# Patient Record
Sex: MALE | Race: WHITE
[De-identification: names, ages, dates, MRNs, and addresses within clinical notes are randomized per-mention and may not be internally consistent; named-entity substitution may affect disease eponyms.]

---

## 2017-07-31 ENCOUNTER — HOSPITAL ENCOUNTER (OUTPATIENT)
Dept: HOSPITAL 47 - RADBDWWP | Age: 67
End: 2017-07-31
Attending: UROLOGY
Payer: COMMERCIAL

## 2017-07-31 DIAGNOSIS — M85.80: Primary | ICD-10-CM

## 2017-07-31 PROCEDURE — 77080 DXA BONE DENSITY AXIAL: CPT

## 2017-07-31 NOTE — BD
EXAMINATION TYPE: MG DEXA axial skeleton.  

 

DATE OF EXAM: 7/31/2017

 

COMPARISON: NONE

 

CLINICAL HISTORY: M81.8 OSTEOPOROSIS W/O CURRENT PATH FX

 

Height:  64.5

Weight:  232

 

FRAX RISK QUESTIONS:

Alcohol (3 or more units per day):  NO

Family History (Parent hip fracture):  NO

Glucocorticoids (More than 3mos):  NO

           (Ex: prednisone, prednisolone, methylprednisolone, dexamethasone, and hydrocortisone).    
     

History of Fracture in Adulthood: NO

Secondary Osteoporosis: NO

  1.  Type 1 Diabetes: NO

  2.  Hyperthyroidism: NO

  3.  Menopause before 45: NA

  4.  Malnutrition: NO

  5.  Chronic liver disease: NO

Rheumatoid Arthritis: NO

Current Tobacco Use: NO

 

RISK FACTORS 

HISTORY OF: 

 

Diet low in dairy products/other sources of calcium:  NO

Lost more than 2 inches in height since high school: NO

Hyperparathyroidism: NO

Adrenal Insufficiency: NO

 

MEDICATIONS: 

Additional Medications: BP MEDS, CHEMO FOR PROSTATE CA, CALCIUM  AND VIT D...X1MO ONLY 

 

 

Additional History: PROSTATE CA

 

 

EXAM MEASUREMENTS: 

Bone mineral densitometry was performed using the [employer] System.

Bone mineral density as measured about the Lumbar spine is:  

----- L1-L4(G/cm2): 1.374

T Score Values are as follows:

----- L1:  2.5

----- L2:  1.2

----- L3:  1.5

----- L4:  1.2

----- L1-L4:  1.6

Bone mineral density     THIS IS HIS FIRST BONE DENSITY TEST.....BASELINE STUDY

 

Bone mineral density about the R hip (g/cm2): 0.974

Bone mineral density about the L hip (g/cm2):  1.004

T Score values are as follows:

-----R Neck: -1.3

-----L Neck:  -0.8

-----R Total:  -0.3

-----L Total:  0.0

Bone mineral density    FIRST BONE DENSITY SCAN.......BASELINE

 

 FRAX %'S:    THERE IS A 6.0% CHANCE OF A MAJOR OSTEOPOROTIC FX AND A 1.2% CHANCE OF A HIP FX....PROB
ABILITY IN 10 YRS TIME

 

IMPRESSION:

Osteopenia (T Score between -2.5 and -1 as noted by T score values at femoral neck level in the right
 hip. There is slightly increased risk of fracture and the patient may be considered  for treatment. 
Re-Screen 2-5 years   

 

NOTE:  T-SCORE=SD OF THE YOUNG ADULT MEAN.

## 2018-06-11 ENCOUNTER — HOSPITAL ENCOUNTER (INPATIENT)
Dept: HOSPITAL 47 - EC | Age: 68
LOS: 3 days | Discharge: NURSING, REHAB OR LONG-TERM CARE | DRG: 65 | End: 2018-06-14
Attending: INTERNAL MEDICINE | Admitting: INTERNAL MEDICINE
Payer: COMMERCIAL

## 2018-06-11 VITALS — BODY MASS INDEX: 31.3 KG/M2

## 2018-06-11 DIAGNOSIS — E78.5: ICD-10-CM

## 2018-06-11 DIAGNOSIS — J84.10: ICD-10-CM

## 2018-06-11 DIAGNOSIS — C61: ICD-10-CM

## 2018-06-11 DIAGNOSIS — Z79.82: ICD-10-CM

## 2018-06-11 DIAGNOSIS — Z80.9: ICD-10-CM

## 2018-06-11 DIAGNOSIS — I11.9: ICD-10-CM

## 2018-06-11 DIAGNOSIS — R29.701: ICD-10-CM

## 2018-06-11 DIAGNOSIS — Z79.899: ICD-10-CM

## 2018-06-11 DIAGNOSIS — Z82.49: ICD-10-CM

## 2018-06-11 DIAGNOSIS — I63.512: Primary | ICD-10-CM

## 2018-06-11 DIAGNOSIS — Z79.891: ICD-10-CM

## 2018-06-11 DIAGNOSIS — Z74.1: ICD-10-CM

## 2018-06-11 DIAGNOSIS — G81.91: ICD-10-CM

## 2018-06-11 LAB
ALBUMIN SERPL-MCNC: 4.4 G/DL (ref 3.5–5)
ALP SERPL-CCNC: 117 U/L (ref 38–126)
ALT SERPL-CCNC: 26 U/L (ref 21–72)
ANION GAP SERPL CALC-SCNC: 17 MMOL/L
APTT BLD: 23.5 SEC (ref 22–30)
AST SERPL-CCNC: 24 U/L (ref 17–59)
BASOPHILS # BLD AUTO: 0 K/UL (ref 0–0.2)
BASOPHILS NFR BLD AUTO: 1 %
BUN SERPL-SCNC: 20 MG/DL (ref 9–20)
CALCIUM SPEC-MCNC: 9.7 MG/DL (ref 8.4–10.2)
CHLORIDE SERPL-SCNC: 101 MMOL/L (ref 98–107)
CK SERPL-CCNC: 208 U/L (ref 55–170)
CO2 SERPL-SCNC: 23 MMOL/L (ref 22–30)
EOSINOPHIL # BLD AUTO: 0.2 K/UL (ref 0–0.7)
EOSINOPHIL NFR BLD AUTO: 2 %
ERYTHROCYTE [DISTWIDTH] IN BLOOD BY AUTOMATED COUNT: 4.57 M/UL (ref 4.3–5.9)
ERYTHROCYTE [DISTWIDTH] IN BLOOD: 13.6 % (ref 11.5–15.5)
GLUCOSE BLD-MCNC: 101 MG/DL (ref 75–99)
GLUCOSE SERPL-MCNC: 106 MG/DL (ref 74–99)
HCT VFR BLD AUTO: 40.5 % (ref 39–53)
HGB BLD-MCNC: 13.9 GM/DL (ref 13–17.5)
INR PPP: 1 (ref ?–1.2)
LYMPHOCYTES # SPEC AUTO: 1.6 K/UL (ref 1–4.8)
LYMPHOCYTES NFR SPEC AUTO: 18 %
MCH RBC QN AUTO: 30.3 PG (ref 25–35)
MCHC RBC AUTO-ENTMCNC: 34.2 G/DL (ref 31–37)
MCV RBC AUTO: 88.5 FL (ref 80–100)
MONOCYTES # BLD AUTO: 0.5 K/UL (ref 0–1)
MONOCYTES NFR BLD AUTO: 6 %
NEUTROPHILS # BLD AUTO: 6.1 K/UL (ref 1.3–7.7)
NEUTROPHILS NFR BLD AUTO: 70 %
PLATELET # BLD AUTO: 232 K/UL (ref 150–450)
POTASSIUM SERPL-SCNC: 4 MMOL/L (ref 3.5–5.1)
PROT SERPL-MCNC: 7.4 G/DL (ref 6.3–8.2)
PT BLD: 10 SEC (ref 9–12)
SODIUM SERPL-SCNC: 141 MMOL/L (ref 137–145)
TROPONIN I SERPL-MCNC: <0.012 NG/ML (ref 0–0.03)
WBC # BLD AUTO: 8.6 K/UL (ref 3.8–10.6)

## 2018-06-11 PROCEDURE — 80053 COMPREHEN METABOLIC PANEL: CPT

## 2018-06-11 PROCEDURE — 93005 ELECTROCARDIOGRAM TRACING: CPT

## 2018-06-11 PROCEDURE — 83090 ASSAY OF HOMOCYSTEINE: CPT

## 2018-06-11 PROCEDURE — 85730 THROMBOPLASTIN TIME PARTIAL: CPT

## 2018-06-11 PROCEDURE — 85025 COMPLETE CBC W/AUTO DIFF WBC: CPT

## 2018-06-11 PROCEDURE — 82550 ASSAY OF CK (CPK): CPT

## 2018-06-11 PROCEDURE — 82553 CREATINE MB FRACTION: CPT

## 2018-06-11 PROCEDURE — 99291 CRITICAL CARE FIRST HOUR: CPT

## 2018-06-11 PROCEDURE — 80061 LIPID PANEL: CPT

## 2018-06-11 PROCEDURE — 70496 CT ANGIOGRAPHY HEAD: CPT

## 2018-06-11 PROCEDURE — 70498 CT ANGIOGRAPHY NECK: CPT

## 2018-06-11 PROCEDURE — 95819 EEG AWAKE AND ASLEEP: CPT

## 2018-06-11 PROCEDURE — 70450 CT HEAD/BRAIN W/O DYE: CPT

## 2018-06-11 PROCEDURE — 84484 ASSAY OF TROPONIN QUANT: CPT

## 2018-06-11 PROCEDURE — 85610 PROTHROMBIN TIME: CPT

## 2018-06-11 PROCEDURE — 36415 COLL VENOUS BLD VENIPUNCTURE: CPT

## 2018-06-11 PROCEDURE — 70551 MRI BRAIN STEM W/O DYE: CPT

## 2018-06-11 PROCEDURE — 71046 X-RAY EXAM CHEST 2 VIEWS: CPT

## 2018-06-11 PROCEDURE — 93306 TTE W/DOPPLER COMPLETE: CPT

## 2018-06-11 NOTE — ED
General Adult HPI





- General


Chief complaint: Neuro Symptoms/Deficit


Stated complaint: Weakness on rt side


Source: patient, family, RN notes reviewed, old records reviewed


Mode of arrival: wheelchair


Limitations: no limitations





- History of Present Illness


Initial comments: 





68-year-old male with history of TIA presents with right sided weakness and 

slurred speech.  Symptoms began sometime between 1 and 2 PM.  His speech was 

noticed by his wife.  He has also had 3 falls since the onset of symptoms.  One 

with head trauma to the right frontal region.  No loss of consciousness.  

Patient is not on anticoagulation, he does take daily aspirin.  On initial 

assessment patient has mild dysarthria.  He is ambulatory.  No chest pain or 

shortness of breath.  No abdominal pain.  No nausea or vomiting.  No fever or 

chills.  No vision changes.  Patient's wife did also notice a right-sided 

facial droop which was resolved prior to arrival.





- Related Data


 Home Medications











 Medication  Instructions  Recorded  Confirmed


 


Acetaminophen-Codeine 300-30mg 1 tab PO Q6H PRN 06/11/18 06/11/18





[Tylenol w/codeine #3]   


 


Aspirin EC [Ecotrin Low Dose] 81 mg PO DAILY 06/11/18 06/11/18


 


Cholecalciferol [Vitamin D3] 1,000 unit PO DAILY 06/11/18 06/11/18


 


Diclofenac Sodium [Voltaren] 75 mg PO BID 06/11/18 06/11/18


 


Enzalutamide [Xtandi] 160 mg PO DAILY 06/11/18 06/11/18


 


Glucosam/Guy-Msm1/C/Tony/Bosw 1 tab PO DAILY 06/11/18 06/11/18





[Glucosamine-Chondroitin Tablet]   


 


Leuprolide Acetate [Eligard] 45 mg SQ Q180D 06/11/18 06/11/18


 


Losartan/Hydrochlorothiazide 1 tab PO DAILY 06/11/18 06/11/18





[Losartan-Hctz 100-25 mg Tab]   


 


Omega-3 Fatty Acids [Omega-3] 1,000 mg PO DAILY 06/11/18 06/11/18











 Allergies











Allergy/AdvReac Type Severity Reaction Status Date / Time


 


No Known Allergies Allergy   Verified 06/11/18 17:15














Review of Systems


ROS Statement: 


Those systems with pertinent positive or pertinent negative responses have been 

documented in the HPI.





ROS Other: All systems not noted in ROS Statement are negative.





Past Medical History


Past Medical History: Hypertension


Additional Past Medical History / Comment(s): prostate cancer


History of Any Multi-Drug Resistant Organisms: None Reported


Additional Past Surgical History / Comment(s): intussesption of bowel.


Past Psychological History: No Psychological Hx Reported


Smoking Status: Never smoker


Past Alcohol Use History: None Reported


Past Drug Use History: None Reported





General Exam


Limitations: no limitations


General appearance: alert, in no apparent distress


Head exam: Present: atraumatic, normocephalic


Eye exam: Present: normal appearance, PERRL, EOMI


ENT exam: Present: normal exam


Neck exam: Present: normal inspection.  Absent: tenderness, meningismus


Respiratory exam: Present: normal lung sounds bilaterally.  Absent: respiratory 

distress, wheezes


Cardiovascular Exam: Present: regular rate.  Absent: normal rhythm, bradycardia


GI/Abdominal exam: Present: soft.  Absent: distended, tenderness


Extremities exam: Present: normal inspection, normal capillary refill.  Absent: 

pedal edema


Back exam: Present: normal inspection.  Absent: full ROM, tenderness


Neurological exam: Present: alert, oriented X3, motor sensory deficit (Patient 

has mild dysarthria, he does have  strength weakness in the right upper 

extremity however there is no drift.  No drift in the right lower extremity.  

No facial droop.  Initial NIH 1)


Psychiatric exam: Present: normal affect, normal mood


Skin exam: Present: warm, dry, intact.  Absent: cyanosis, diaphoretic





Course


 Vital Signs











  06/11/18 06/11/18 06/11/18





  16:51 17:00 17:15


 


Temperature 98.1 F 97.9 F 97.5 F L


 


Pulse Rate 70 80 68


 


Respiratory 16 16 16





Rate   


 


Blood Pressure 172/90 191/86 179/78


 


O2 Sat by Pulse 99 96 99





Oximetry   














  06/11/18 06/11/18





  17:25 17:40


 


Temperature 97.8 F 98.0 F


 


Pulse Rate 72 70


 


Respiratory 16 16





Rate  


 


Blood Pressure 160/72 162/72


 


O2 Sat by Pulse 98 98





Oximetry  














EKG Findings





- EKG Comments:


EKG Findings:: EKG: Sinus rhythm with frequent PVC, left anterior fascicular 

block, prolonged QT at 496, rate of 84, [place] interval 176, QRS duration 114





Medical Decision Making





- Medical Decision Making





68-year-old male presenting with strokelike symptoms including right upper 

extremity weakness and dysarthria.  Patient's wife did report facial droop 

which was resolved prior to arrival.  Patient's dysarthria is minimal.  There 

is no aphasia.  NIH is 1.  There is some decreased  strength and some 

weakness in the right upper extremity however there is no drift patient is able 

to hold his arm up without difficulty.  Head CT is obtained, is negative for 

intracranial hemorrhage, CT angiography is obtained, does show mild vascular 

disease with no acute occlusion.  Case is discussed with Dr. Cohen at 1710.

  We both agree given the low NIH that this patient is not a TPA candidate.











Patient will be admitted for further stroke workup.  Case discussed with Dr. Bloom, will accept admission





- Lab Data


Result diagrams: 


 06/11/18 16:55





 06/11/18 16:55


 Lab Results











  06/11/18 06/11/18 06/11/18 Range/Units





  16:51 16:55 16:55 


 


WBC   8.6   (3.8-10.6)  k/uL


 


RBC   4.57   (4.30-5.90)  m/uL


 


Hgb   13.9   (13.0-17.5)  gm/dL


 


Hct   40.5   (39.0-53.0)  %


 


MCV   88.5   (80.0-100.0)  fL


 


MCH   30.3   (25.0-35.0)  pg


 


MCHC   34.2   (31.0-37.0)  g/dL


 


RDW   13.6   (11.5-15.5)  %


 


Plt Count   232   (150-450)  k/uL


 


Neutrophils %   70   %


 


Lymphocytes %   18   %


 


Monocytes %   6   %


 


Eosinophils %   2   %


 


Basophils %   1   %


 


Neutrophils #   6.1   (1.3-7.7)  k/uL


 


Lymphocytes #   1.6   (1.0-4.8)  k/uL


 


Monocytes #   0.5   (0-1.0)  k/uL


 


Eosinophils #   0.2   (0-0.7)  k/uL


 


Basophils #   0.0   (0-0.2)  k/uL


 


PT     (9.0-12.0)  sec


 


INR     (<1.2)  


 


APTT     (22.0-30.0)  sec


 


Sodium    141  (137-145)  mmol/L


 


Potassium    4.0  (3.5-5.1)  mmol/L


 


Chloride    101  ()  mmol/L


 


Carbon Dioxide    23  (22-30)  mmol/L


 


Anion Gap    17  mmol/L


 


BUN    20  (9-20)  mg/dL


 


Creatinine    0.96  (0.66-1.25)  mg/dL


 


Est GFR (CKD-EPI)AfAm    >90  (>60 ml/min/1.73 sqM)  


 


Est GFR (CKD-EPI)NonAf    80  (>60 ml/min/1.73 sqM)  


 


Glucose    106 H  (74-99)  mg/dL


 


POC Glucose (mg/dL)  101 H    (75-99)  mg/dL


 


POC Glu Operater ID  Shavon Huston    


 


Calcium    9.7  (8.4-10.2)  mg/dL


 


Total Bilirubin    0.9  (0.2-1.3)  mg/dL


 


AST    24  (17-59)  U/L


 


ALT    26  (21-72)  U/L


 


Alkaline Phosphatase    117  ()  U/L


 


Total Creatine Kinase     ()  U/L


 


CK-MB (CK-2)     (0.0-2.4)  ng/mL


 


CK-MB (CK-2) Rel Index     


 


Troponin I     (0.000-0.034)  ng/mL


 


Total Protein    7.4  (6.3-8.2)  g/dL


 


Albumin    4.4  (3.5-5.0)  g/dL














  06/11/18 06/11/18 Range/Units





  16:55 16:55 


 


WBC    (3.8-10.6)  k/uL


 


RBC    (4.30-5.90)  m/uL


 


Hgb    (13.0-17.5)  gm/dL


 


Hct    (39.0-53.0)  %


 


MCV    (80.0-100.0)  fL


 


MCH    (25.0-35.0)  pg


 


MCHC    (31.0-37.0)  g/dL


 


RDW    (11.5-15.5)  %


 


Plt Count    (150-450)  k/uL


 


Neutrophils %    %


 


Lymphocytes %    %


 


Monocytes %    %


 


Eosinophils %    %


 


Basophils %    %


 


Neutrophils #    (1.3-7.7)  k/uL


 


Lymphocytes #    (1.0-4.8)  k/uL


 


Monocytes #    (0-1.0)  k/uL


 


Eosinophils #    (0-0.7)  k/uL


 


Basophils #    (0-0.2)  k/uL


 


PT   10.0  (9.0-12.0)  sec


 


INR   1.0  (<1.2)  


 


APTT   23.5  (22.0-30.0)  sec


 


Sodium    (137-145)  mmol/L


 


Potassium    (3.5-5.1)  mmol/L


 


Chloride    ()  mmol/L


 


Carbon Dioxide    (22-30)  mmol/L


 


Anion Gap    mmol/L


 


BUN    (9-20)  mg/dL


 


Creatinine    (0.66-1.25)  mg/dL


 


Est GFR (CKD-EPI)AfAm    (>60 ml/min/1.73 sqM)  


 


Est GFR (CKD-EPI)NonAf    (>60 ml/min/1.73 sqM)  


 


Glucose    (74-99)  mg/dL


 


POC Glucose (mg/dL)    (75-99)  mg/dL


 


POC Glu Operater ID    


 


Calcium    (8.4-10.2)  mg/dL


 


Total Bilirubin    (0.2-1.3)  mg/dL


 


AST    (17-59)  U/L


 


ALT    (21-72)  U/L


 


Alkaline Phosphatase    ()  U/L


 


Total Creatine Kinase  208 H   ()  U/L


 


CK-MB (CK-2)  1.0   (0.0-2.4)  ng/mL


 


CK-MB (CK-2) Rel Index  0.5   


 


Troponin I  <0.012   (0.000-0.034)  ng/mL


 


Total Protein    (6.3-8.2)  g/dL


 


Albumin    (3.5-5.0)  g/dL














Critical Care Time


Critical Care Time: Yes


Total Critical Care Time: 35





Disposition


Clinical Impression: 


 Cerebrovascular accident





Disposition: ADMITTED AS IP TO THIS [facility]


Condition: Stable


Is patient prescribed a controlled substance at d/c from ED?: No


Referrals: 


Benita Gaytan MD [Primary Care Provider] - 1-2 days


Decision to Admit Reason: Admit from EC


Decision Date: 06/11/18

## 2018-06-11 NOTE — XR
EXAMINATION TYPE: XR chest 2V

 

DATE OF EXAM: 6/11/2018

 

COMPARISON: NONE

 

HISTORY: Right-sided weakness

 

TECHNIQUE:  Frontal and lateral views of the chest are obtained.

 

FINDINGS:  Heart is enlarged. There is no gross heart failure. There is slight coarsening of intersti
tial markings. There are chest leads. Thoracic aorta is atheromatous.

 

IMPRESSION:  Cardiomegaly. Mild pulmonary fibrosis.

## 2018-06-11 NOTE — CT
EXAMINATION TYPE: CT angio head neck

 

DATE OF EXAM: 6/11/2018

 

HISTORY: Right sided weakness today.

 

COMPARISON: NONE

 

CT DLP: 470.5 mGycm.  Automated Exposure Control for Dose Reduction was Utilized.

 

TECHNIQUE:  CTA scan of the neck is performed with IV Contrast, patient injected with 65 mL of Isovue
 370, axial images are obtained, coronal and sagittal reformatted images are reviewed. Three-D recons
tructed images are created on an independent workstation and reviewed.

 

FINDINGS:

 

There is normal branching pattern of the great vessels on the aortic arch. There is bilateral vertebr
al artery flow. There is arterial flow in the common internal and external carotid arteries bilateral
ly. There is bilateral plaque at the carotid artery bifurcations with approximate 25% stenosis.

 

There is arterial flow in the anterior middle and posterior cerebral arteries. There is arterial flow
 in the vertebrobasilar artery system. The left posterior cerebral artery appears to fill from the le
ft posterior communicating artery. I see no evidence of aneurysm or neovascularity. There is normal c
ontrast opacification of the venous sinuses.

 

IMPRESSION:

Mild atherosclerotic vascular disease. No evidence of hemodynamically significant stenosis. Negative 
CT angiogram of the brain.

## 2018-06-11 NOTE — CT
EXAMINATION TYPE: CT brain wo con for TPA

 

DATE OF EXAM: 6/11/2018

 

COMPARISON: NONE

 

HISTORY: Right sided weakness today.

 

CT DLP: 1192.13 mGycm

Automated exposure control for dose reduction was used.

 

FINDINGS: 

There is mild cerebral cortical atrophy. There is no mass effect nor midline shift. There is no sign 
of intracranial hemorrhage. There is mild hypodensity in the periventricular white matter. The calvar
ium is intact.

 

IMPRESSION: 

CEREBRAL ATROPHY AND CHRONIC SMALL VESSEL ISCHEMIA. NO ACUTE INTRACRANIAL ABNORMALITY.

## 2018-06-12 LAB
CHOLEST SERPL-MCNC: 214 MG/DL (ref ?–200)
HDLC SERPL-MCNC: 58 MG/DL (ref 40–60)
LDLC SERPL CALC-MCNC: 125 MG/DL (ref 0–99)
TRIGL SERPL-MCNC: 157 MG/DL (ref ?–150)

## 2018-06-12 RX ADMIN — LOSARTAN POTASSIUM AND HYDROCHLOROTHIAZIDE SCH EACH: 12.5; 5 TABLET ORAL at 15:38

## 2018-06-12 RX ADMIN — ETODOLAC SCH MG: 400 TABLET, FILM COATED ORAL at 13:31

## 2018-06-12 RX ADMIN — ETODOLAC SCH MG: 400 TABLET, FILM COATED ORAL at 20:52

## 2018-06-12 RX ADMIN — ATORVASTATIN CALCIUM SCH: 20 TABLET, FILM COATED ORAL at 20:53

## 2018-06-12 RX ADMIN — ASPIRIN SCH MG: 325 TABLET ORAL at 14:47

## 2018-06-12 NOTE — P.CONS
History of Present Illness





- Chief Complaint


Gait disturbance





- History of Present Illness





I had the opportunity to see patient for inpatient rehab consultation with 

regard to gait disturbance.  He was admitted to [facility] yesterday, June 11, 

right-sided weakness and slurring of speech.  Chest x-ray demonstrates 

cardiomegaly and mild pulmonary fibrosis.  Head CT with cerebral atrophy and 

small vessel change.  Angio CT with mild atherosclerosis.  PT reports minimal 

assistance for transfers and gait 100 feet with roller walker.  OT reports 

supervision for upper and lower dressing and toileting but minimal assistance 

for bathing and functional mobility.  Speech therapy notes mild dysarthria.





Previous functional history as elicited from patient: 68-year-old right-handed 

more than left-handed white male who is  lives and 2 floor home with 

wife.  Semiretired.  Patient can share the cooking and laundry and is 

independent with driving, standing shower and gait without device.  Doesn't 

smoke and admits to 2-3 drinks a day.  Annalise Gaytan is regular doctor.





Family history of hypertension in both parents and cancer in mother.





Review of Systems





Review of systems:


ENT: Denies sneezes or discharge.


Eyes: Denies discharge or photophobia.


Cardiac: Denies chest pain or palpitation.


Pulmonary: Denies cough or shortness of breath.


Gastrointestinal: Denies nausea, emesis, constipation, diarrhea.


Genitourinary: Denies discharge or frequency.


Musculoskeletal: Denies muscle or bone aches.


Neurologic: Right arm and hand weakness.  Denies dysarthria or any perioral 

sensations..


Endocrine: Denies shakes or sweats.


Oncology: Denies cancers.


Dermatologic: Denies rash, itching, pruritus.


ALLERGY/immunology: Denies sneezes, rashes.








Past Medical History


Past Medical History: Cancer, CVA/TIA, Hypertension, Pneumonia


Additional Past Medical History / Comment(s): prostate cancer (2012)- Hormone 

shots and pills; TIA 10-15 year ago no residua


History of Any Multi-Drug Resistant Organisms: None Reported


Additional Past Surgical History / Comment(s): intussesption of bowel.


Past Anesthesia/Blood Transfusion Reactions: No Reported Reaction


Past Psychological History: No Psychological Hx Reported


Smoking Status: Never smoker


Past Alcohol Use History: Daily


Additional Past Alcohol Use History / Comment(s): Pt states he drinks 2 beers 

and day and occasional shares a 5th with his wife


Past Drug Use History: None Reported





Medications and Allergies


 Home Medications











 Medication  Instructions  Recorded  Confirmed  Type


 


Acetaminophen-Codeine 300-30mg 1 tab PO Q6H PRN 06/11/18 06/11/18 History





[Tylenol w/codeine #3]    


 


Aspirin EC [Ecotrin Low Dose] 81 mg PO DAILY 06/11/18 06/11/18 History


 


Cholecalciferol [Vitamin D3] 1,000 unit PO DAILY 06/11/18 06/11/18 History


 


Diclofenac Sodium [Voltaren] 75 mg PO BID 06/11/18 06/11/18 History


 


Enzalutamide [Xtandi] 160 mg PO DAILY 06/11/18 06/11/18 History


 


Glucosam/Guy-Msm1/C/Tony/Bosw 1 tab PO DAILY 06/11/18 06/11/18 History





[Glucosamine-Chondroitin Tablet]    


 


Leuprolide Acetate [Eligard] 45 mg SQ Q180D 06/11/18 06/11/18 History


 


Losartan/Hydrochlorothiazide 1 tab PO DAILY 06/11/18 06/11/18 History





[Losartan-Hctz 100-25 mg Tab]    


 


Omega-3 Fatty Acids [Omega-3] 1,000 mg PO DAILY 06/11/18 06/11/18 History











 Allergies











Allergy/AdvReac Type Severity Reaction Status Date / Time


 


No Known Allergies Allergy   Verified 06/11/18 17:15














Physical Exam


Vitals: 


 Vital Signs











  Temp Pulse Pulse Pulse Resp BP BP


 


 06/12/18 16:00  98.6 F    68  16  


 


 06/12/18 12:00  98.9 F    65  16  


 


 06/12/18 08:30  98.5 F    65  16  


 


 06/12/18 08:00     65  16  


 


 06/12/18 04:00  97.6 F    71  16  


 


 06/12/18 00:00  97.1 F L    62  16  


 


 06/11/18 23:08  97.8 F  63    16  152/63 


 


 06/11/18 22:04  97.7 F   71   16   156/69


 


 06/11/18 22:00  97.1 F L    62  16  


 


 06/11/18 21:00  97.8 F   67   18   170/79


 


 06/11/18 20:48   65    18  161/76 


 


 06/11/18 19:12  97.4 F L  79    18  196/85 


 


 06/11/18 19:04  98.4 F   72   16   186/85


 


 06/11/18 17:55  98.1 F  67    16  156/69 


 


 06/11/18 17:40  98.0 F  70    16  162/72 


 


 06/11/18 17:25  97.8 F  72    16  160/72 


 


 06/11/18 17:15  97.5 F L  68    16  179/78 


 


 06/11/18 17:00  97.9 F  80    16  191/86 


 


 06/11/18 16:51  98.1 F  70    16  172/90 














  BP Pulse Ox


 


 06/12/18 16:00  136/73  96


 


 06/12/18 12:00  177/65  96


 


 06/12/18 08:30  147/78  94 L


 


 06/12/18 08:00  


 


 06/12/18 04:00  142/82  99


 


 06/12/18 00:00  180/74  98


 


 06/11/18 23:08   97


 


 06/11/18 22:04   96


 


 06/11/18 22:00  180/74  98


 


 06/11/18 21:00  


 


 06/11/18 20:48   99


 


 06/11/18 19:12   99


 


 06/11/18 19:04   99


 


 06/11/18 17:55   99


 


 06/11/18 17:40   98


 


 06/11/18 17:25   98


 


 06/11/18 17:15   99


 


 06/11/18 17:00   96


 


 06/11/18 16:51   99








 Intake and Output











 06/12/18 06/12/18 06/12/18





 06:59 14:59 22:59


 


Intake Total  180 


 


Output Total 400  


 


Balance -400 180 


 


Intake:   


 


  Oral  180 


 


Output:   


 


  Urine 400  


 


Other:   


 


  Voiding Method Urinal Urinal Toilet


 


  # Voids  1 2


 


  Weight 90.7 kg  














Skin: Atrophic, intact.


General: Medium build and comfortable appearance.


Head: Normocephalic, atraumatic.


Eyes: Symmetric.  Pupils equal round.


Ears: Symmetric.  Hearing within normal limits.


Mouth: Clear.


Neck: Supple.  Carotid without bruit.


Cardiac: Regular rate and rhythm.


Lungs: Clear anteriorly and posteriorly.


Abdomen: Soft active nontender.


Extremities: Normal tone.


Neurological: Mental status: Alert, cooperative, pleasant.


Cranial nerves: Symmetric facial tone and trapezius.


Motor: Normal strength and isolation left side.  Right arm and leg demonstrate 

elements of isolation all the right hand is weak, 3+ over 5 and right ankle is 4

/5.


Sensation: Intact throughout.


DTRs: Symmetric and equal throughout.


Mobility: Sits with minimal assistance.





Results


CBC & Chem 7: 


 06/11/18 16:55





 06/11/18 16:55


Labs: 


 Abnormal Lab Results - Last 24 Hours (Table)











  06/11/18 06/11/18 06/11/18 Range/Units





  16:51 16:55 16:55 


 


Glucose   106 H   (74-99)  mg/dL


 


POC Glucose (mg/dL)  101 H    (75-99)  mg/dL


 


Total Creatine Kinase    208 H  ()  U/L


 


Triglycerides     (<150)  mg/dL


 


Cholesterol     (<200)  mg/dL


 


LDL Cholesterol, Calc     (0-99)  mg/dL














  06/11/18 Range/Units





  16:55 


 


Glucose   (74-99)  mg/dL


 


POC Glucose (mg/dL)   (75-99)  mg/dL


 


Total Creatine Kinase   ()  U/L


 


Triglycerides  157 H  (<150)  mg/dL


 


Cholesterol  214 H  (<200)  mg/dL


 


LDL Cholesterol, Calc  125 H  (0-99)  mg/dL











Chest x-ray: report reviewed (Cardiomegaly and mild pulmonary fibrosis.)


CT Scan - head: report reviewed (Cerebral atrophy and small vessel change.  

Angios CT demonstrates mild atherosclerotic change bilateral.)





Assessment and Plan


(1) Cerebrovascular accident


Current Visit: Yes   Status: Acute   Code(s): I63.9 - CEREBRAL INFARCTION, 

UNSPECIFIED   SNOMED Code(s): 816351803


   


Plan: 





Impression:


1.  Gait disturbance.


2.  Left MCA infarct result in right hemiparesthesias and dysarthria.


3.  Hypertension.


4.  Prostate cancer.





Comments and plan: Patient really just admitted yesterday.  Possible that this 

is a mini stroke or TIA and resolved.  We'll follow for this possibility.  We'

ll also follow for possibility at this is a stroke and deficits are permanent 

and may require inpatient rehab.  Note PT, OT, SLP ongoing.

## 2018-06-12 NOTE — P.HPIM
History of Present Illness


68-year-old pleasant gentleman with history of prostate cancer on hormonal 

therapy and biologic agents for his prostate cancer came in for complaints of 

slurred speech which began at around 1 PM yesterday with weakness and numbness 

in the right side of the body patient today still has some droop of his right 

hand although does have normal strength in the right hand and leg with some 

facial droop to the left.  Patient had a CT of the head CT angios the head, 

echocardiac, which are negative neurology evaluation is pending.  Patient 

passed swallow eval awaiting PT and OT recommendations








Review of Systems


REVIEW OF SYSTEMS: 


CONSTITUTIONAL: No fever, no malaise, no fatigue. 


HEENT: No recent visual problems or hearing problems. Denied any sore throat. 


CARDIOVASCULAR: No chest pain, orthopnea, PND, no palpitations, no syncope. 


PULMONARY: No shortness of breath, no cough, no hemoptysis. 


GASTROINTESTINAL: No diarrhea, no nausea, no vomiting, no abdominal pain. 

Normoactive bowel sounds. 


NEUROLOGICAL: No headaches, 


HEMATOLOGICAL: Denies any bleeding or petechiae. 


GENITOURINARY: Denies any burning micturition, frequency, or urgency. 


MUSCULOSKELETAL/RHEUMATOLOGICAL: Denies any joint pain, swelling, or any muscle 

pain. 


ENDOCRINE: Denies any polyuria or polydipsia. 





The rest of the 14-point review of systems is negative.











Past Medical History


Past Medical History: Cancer, CVA/TIA, Hypertension, Pneumonia


Additional Past Medical History / Comment(s): prostate cancer (2012)- Hormone 

shots and pills; TIA 10-15 year ago no residua


History of Any Multi-Drug Resistant Organisms: None Reported


Additional Past Surgical History / Comment(s): intussesption of bowel.


Past Anesthesia/Blood Transfusion Reactions: No Reported Reaction


Past Psychological History: No Psychological Hx Reported


Smoking Status: Never smoker


Past Alcohol Use History: Daily


Additional Past Alcohol Use History / Comment(s): Pt states he drinks 2 beers 

and day and occasional shares a 5th with his wife


Past Drug Use History: None Reported





Medications and Allergies


 Home Medications











 Medication  Instructions  Recorded  Confirmed  Type


 


Acetaminophen-Codeine 300-30mg 1 tab PO Q6H PRN 06/11/18 06/11/18 History





[Tylenol w/codeine #3]    


 


Aspirin EC [Ecotrin Low Dose] 81 mg PO DAILY 06/11/18 06/11/18 History


 


Cholecalciferol [Vitamin D3] 1,000 unit PO DAILY 06/11/18 06/11/18 History


 


Diclofenac Sodium [Voltaren] 75 mg PO BID 06/11/18 06/11/18 History


 


Enzalutamide [Xtandi] 160 mg PO DAILY 06/11/18 06/11/18 History


 


Glucosam/Gyu-Msm1/C/Tony/Bosw 1 tab PO DAILY 06/11/18 06/11/18 History





[Glucosamine-Chondroitin Tablet]    


 


Leuprolide Acetate [Eligard] 45 mg SQ Q180D 06/11/18 06/11/18 History


 


Losartan/Hydrochlorothiazide 1 tab PO DAILY 06/11/18 06/11/18 History





[Losartan-Hctz 100-25 mg Tab]    


 


Omega-3 Fatty Acids [Omega-3] 1,000 mg PO DAILY 06/11/18 06/11/18 History











 Allergies











Allergy/AdvReac Type Severity Reaction Status Date / Time


 


No Known Allergies Allergy   Verified 06/11/18 17:15














Physical Exam


Vitals: 


 Vital Signs











  Temp Pulse Pulse Pulse Resp BP BP


 


 06/12/18 12:00  98.9 F    65  16  


 


 06/12/18 08:30  98.5 F    65  16  


 


 06/12/18 08:00     65  16  


 


 06/12/18 04:00  97.6 F    71  16  


 


 06/12/18 00:00  97.1 F L    62  16  


 


 06/11/18 23:08  97.8 F  63    16  152/63 


 


 06/11/18 22:04  97.7 F   71   16   156/69


 


 06/11/18 22:00  97.1 F L    62  16  


 


 06/11/18 21:00  97.8 F   67   18   170/79


 


 06/11/18 20:48   65    18  161/76 


 


 06/11/18 19:12  97.4 F L  79    18  196/85 


 


 06/11/18 19:04  98.4 F   72   16   186/85


 


 06/11/18 17:55  98.1 F  67    16  156/69 


 


 06/11/18 17:40  98.0 F  70    16  162/72 


 


 06/11/18 17:25  97.8 F  72    16  160/72 


 


 06/11/18 17:15  97.5 F L  68    16  179/78 


 


 06/11/18 17:00  97.9 F  80    16  191/86 


 


 06/11/18 16:51  98.1 F  70    16  172/90 














  BP Pulse Ox


 


 06/12/18 12:00  177/65  96


 


 06/12/18 08:30  147/78  94 L


 


 06/12/18 08:00  


 


 06/12/18 04:00  142/82  99


 


 06/12/18 00:00  180/74  98


 


 06/11/18 23:08   97


 


 06/11/18 22:04   96


 


 06/11/18 22:00  180/74  98


 


 06/11/18 21:00  


 


 06/11/18 20:48   99


 


 06/11/18 19:12   99


 


 06/11/18 19:04   99


 


 06/11/18 17:55   99


 


 06/11/18 17:40   98


 


 06/11/18 17:25   98


 


 06/11/18 17:15   99


 


 06/11/18 17:00   96


 


 06/11/18 16:51   99








 Intake and Output











 06/12/18 06/12/18 06/12/18





 06:59 14:59 22:59


 


Intake Total  180 


 


Output Total 400  


 


Balance -400 180 


 


Intake:   


 


  Oral  180 


 


Output:   


 


  Urine 400  


 


Other:   


 


  Voiding Method Urinal Urinal 


 


  # Voids  1 2


 


  Weight 90.7 kg  











PHYSICAL EXAMINATION: 





GENERAL: The patient is alert and oriented x3, not in any acute distress. Well 

developed, well nourished. 


HEENT: Pupils are round and equally reacting to light. EOMI. No scleral 

icterus. No conjunctival pallor. Normocephalic, atraumatic. No pharyngeal 

erythema. No thyromegaly. 


CARDIOVASCULAR: S1 and S2 present. No murmurs, rubs, or gallops. 


PULMONARY: Chest is clear to auscultation, no wheezing or crackles. 


ABDOMEN: Soft, nontender, nondistended, normoactive bowel sounds. No palpable 

organomegaly. 


MUSCULOSKELETAL: No joint swelling or deformity.


EXTREMITIES: No cyanosis, clubbing, or pedal edema. 


NEUROLOGICAL: As mentioned in HPI


SKIN: No rashes. 











Results


CBC & Chem 7: 


 06/11/18 16:55





 06/11/18 16:55


Labs: 


 Abnormal Lab Results - Last 24 Hours (Table)











  06/11/18 06/11/18 06/11/18 Range/Units





  16:51 16:55 16:55 


 


Glucose   106 H   (74-99)  mg/dL


 


POC Glucose (mg/dL)  101 H    (75-99)  mg/dL


 


Total Creatine Kinase    208 H  ()  U/L


 


Triglycerides     (<150)  mg/dL


 


Cholesterol     (<200)  mg/dL


 


LDL Cholesterol, Calc     (0-99)  mg/dL














  06/11/18 Range/Units





  16:55 


 


Glucose   (74-99)  mg/dL


 


POC Glucose (mg/dL)   (75-99)  mg/dL


 


Total Creatine Kinase   ()  U/L


 


Triglycerides  157 H  (<150)  mg/dL


 


Cholesterol  214 H  (<200)  mg/dL


 


LDL Cholesterol, Calc  125 H  (0-99)  mg/dL














Thrombosis Risk Factor Assmnt





- Choose All That Apply


Any of the Below Risk Factors Present?: Yes


Each Factor Represents 1 point: Obesity (BMI >25)


Other Risk Factors: Yes


Each Risk Factor Represents 2 Points: Age 61-74 years


Thrombosis Risk Factor Assessment Total Risk Factor Score: 3


Thrombosis Risk Factor Assessment Level: Moderate Risk





Assessment and Plan


Plan: 


-Possibility of CVA involving the left cerebral hemisphere possible ischemic in 

nature considering entire right side of the body involvement.  May need an MRI 

considering history of prostate cancer.  Awaiting neurological consultation 

patient is on aspirin and a statin all the workup is negative as mentioned 

above so far


-Hypertension: Continue with his losartan and hydrochlorothiazide


-Prostate cancer will resume his biologic agents and antiandrogen therapy


-CVA TIA in the past

## 2018-06-12 NOTE — ECHOF
Referral Reason:Thrombus



MEASUREMENTS

--------

HEIGHT: 170.2 cm

WEIGHT: 90.3 kg

BP: 142/82

RVIDd:   3.0 cm     (< 3.3)

IVSd:   1.5 cm     (0.6 - 1.1)

LVIDd:   3.5 cm     (3.9 - 5.3)

LVPWd:   1.5 cm     (0.6 - 1.1)

IVSs:   1.9 cm

LVIDs:   2.9 cm

LVPWs:   1.9 cm

LA Diam:   3.9 cm     (2.7 - 3.8)

LAESV Index (A-L):   37.05 ml/m

Ao Diam:   3.3 cm     (2.0 - 3.7)

AV Cusp:   1.0 cm     (1.5 - 2.6)

MV EXCURSION:   8.460 mm     (> 18.000)

MV EF SLOPE:   9 mm/s     (70 - 150)

EPSS:   1.8 cm

MV E Lg:   1.59 m/s

MV DecT:   309 ms

MV A Lg:   1.23 m/s

MV E/A Ratio:   1.29 

AV maxP.14 mmHg

AV meanP.09 mmHg

RAP:   5.00 mmHg

RVSP:   33.38 mmHg







FINDINGS

--------

Sinus rhythm.

This was a technically adequate study.

The left ventricular size is normal.   There is moderate concentric left ventricular hypertrophy.   O
verall left ventricular systolic function is normal with, an EF between 60 - 65 %.

The right ventricle is normal in size.

LA is moderately dilated 34-39 ml/m2

The right atrium is normal in size.

There is moderate aortic valve sclerosis.   There is mild aortic stenosis present.   Peak/mean gradie
nt across the Aortic Valve is 31.14mmHg / 16.09mmHg.

The mitral valve leaflets are moderately thickened.   Severe mitral annular calcification present.   
There is trace mitral regurgitation.

Mild tricuspid regurgitation present.   Right ventricular systolic pressure is normal at < 35 mmHg.

Trace/mild (physiologic)  pulmonic regurgitation.

The aortic root size is normal.

IVC Not well visulized.

There is no pericardial effusion.



CONCLUSIONS

--------

1. Sinus rhythm.

2. This was a technically adequate study.

3. The left ventricular size is normal.

4. There is moderate concentric left ventricular hypertrophy.

5. Overall left ventricular systolic function is normal with, an EF between 60 - 65 %.

6. The right ventricle is normal in size.

7. LA is moderately dilated 34-39 ml/m2

8. The right atrium is normal in size.

9. There is moderate aortic valve sclerosis.

10. There is mild aortic stenosis present.

11. Peak/mean gradient across the Aortic Valve is 31.14mmHg / 16.09mmHg.

12. The mitral valve leaflets are moderately thickened.

13. Severe mitral annular calcification present.

14. There is trace mitral regurgitation.

15. Mild tricuspid regurgitation present.

16. Right ventricular systolic pressure is normal at < 35 mmHg.

17. Trace/mild (physiologic)  pulmonic regurgitation.

18. The aortic root size is normal.

19. IVC Not well visulized.

20. There is no pericardial effusion.





SONOGRAPHER: Lin Vo RDCS

## 2018-06-12 NOTE — CONS
CONSULTATION



DATE OF CONSULTATION:

06/12/2018



CHIEF COMPLAINT:

Stroke.



HISTORY OF PRESENT ILLNESS:

Mr. Wooten is a pleasant 68-year-old  male who is being evaluated by the

neurology service per the request of Dr. Bloom for a stroke.  The patient was brought

into [facility] Emergency Room with complaints of slurred speech and right-

sided weakness.  His symptoms did improve in the emergency room.  A CT scan of the

brain was done which showed no acute intracranial abnormalities.  There was generalized

atrophy and small-vessel ischemic changes.  A CT angiogram of the brain and CT

angiogram of the neck were normal.  His CBC, INR, cardiac enzymes and comprehensive

metabolic profile were all normal.  His fasting lipid panel showed dyslipidemia with a

cholesterol of 214 and LDL of 125.  The patient does take aspirin daily at home.  The

patient was admitted for further workup and management. After his admission, the

nursing staff noticed that his weakness and slurred speech did again worsen.  At the

time of my evaluation, the patient is sitting in his bedside chair and appears to be in

no acute distress.  He continues to have dysarthria and obvious facial drooping.  He

denies any headache.



PAST MEDICAL HISTORY:

1. Transient ischemic attack.

2. Hypertension.

3. Prostate cancer.



SOCIAL HISTORY:

He denies any tobacco or drug use.  He drinks alcohol on a daily basis.



FAMILY HISTORY:

Noncontributory.



HOME MEDICATIONS:

Reviewed in the chart.



ALLERGIES:

NO KNOWN DRUG ALLERGIES.



REVIEW OF SYSTEMS:

CONSTITUTIONAL:  Positive for fatigue.

EYES:  Negative.

ENT:  Negative.

CARDIOVASCULAR:  Negative.

RESPIRATORY:  Negative.

NEUROLOGICAL:  As mentioned above.

GASTROINTESTINAL:  Positive for occasional heartburn.

GENITOURINARY:  As mentioned above.

PSYCHIATRIC:  Negative.

ENDOCRINE:  Negative.

DERMATOLOGICAL:  Negative.

MUSCULOSKELETAL:  Positive for occasional joint pain.



PHYSICAL EXAMINATION:

Vital signs show a temperature of 98.6, pulse 68, respiration 16, blood pressure

136/73.

GENERAL APPEARANCE:  The patient is a well-developed, elderly  male who

appears to be in no acute distress.

HEENT:  Normocephalic, atraumatic. Right facial drooping is seen.

NECK:  Supple with no masses felt.

CARDIOVASCULAR:  Regular rate and rhythm.

ABDOMEN:  Nontender, nondistended.

Extremities showed edema with no clubbing seen.

NEUROLOGICAL EXAMINATION: The patient is awake and oriented x3.  Speech is dysarthric.

Language testing is normal.  Strength is 4- out of 5 in the right upper extremity, 4

out of 5 in the right lower extremity, and 5- out of 5 on the left side.  Pronator

drift is seen on the right upper extremity.  Sensory exam was normal to light touch in

all 4 extremities.  Cranial nerve testing showed right facial weakness.  No tremors or

seizure-like activity is seen.



IMPRESSION:

1. Acute ischemic stroke, left middle cerebral artery distribution.

2. Right hemiparesis.

3. Dysarthria.

4. Dyslipidemia.

5. Hypertension.



RECOMMENDATION:

The patient does appear to have suffered an acute ischemic stroke and continues to have

dysarthria and right hemiparesis.  His CT scan of the brain showed no acute

intracranial abnormalities.  I will order an MRI of the brain without contrast.  The

patient was on aspirin for anti-platelet therapy at home.  I will switch his aspirin to

Plavix 75 mg daily.  I will order a serum homocystine level and an EEG.  His fasting

lipid panel was abnormal.  I will start him on Lipitor 20 mg daily.  Possible side

effects were discussed with the patient.  Physical Therapy, Occupational Therapy and

Speech Therapy are following.  I do recommend heparin for DVT prophylaxis and Protonix

for GI prophylaxis.  I will continue to follow with you.  Further recommendations to

follow.



Thank you for allowing me to participate in the care of your patient.  If you have any

questions, please feel free to contact me.





PRIYA / RITU: [id number] / Job#: [id number]

## 2018-06-13 RX ADMIN — ASPIRIN SCH MG: 325 TABLET ORAL at 07:53

## 2018-06-13 RX ADMIN — CLOPIDOGREL BISULFATE SCH MG: 75 TABLET ORAL at 07:51

## 2018-06-13 RX ADMIN — ETODOLAC SCH MG: 400 TABLET, FILM COATED ORAL at 07:52

## 2018-06-13 RX ADMIN — ETODOLAC SCH MG: 400 TABLET, FILM COATED ORAL at 21:38

## 2018-06-13 RX ADMIN — LOSARTAN POTASSIUM AND HYDROCHLOROTHIAZIDE SCH EACH: 12.5; 5 TABLET ORAL at 07:51

## 2018-06-13 RX ADMIN — ATORVASTATIN CALCIUM SCH MG: 20 TABLET, FILM COATED ORAL at 21:38

## 2018-06-13 NOTE — EEG
ELECTROENCEPHALOGRAM REPORT



DATE OF SERVICE:

06/13/2018



REASON FOR TESTING:

Stroke.



DESCRIPTION OF THE PROCEDURE:

This EEG was performed using a 21 channel digital electroencephalograph, following

international 10-20 system.



DESCRIPTION OF THE RECORDING:

From the beginning of the tracing, and with patient's eyes closed, the background

rhythm was mostly consisting of 9 Hz alpha frequency in the posterior occipital leads.

No obvious asymmetry is seen.  Photic stimulation was performed with a minimal driving

response seen.  No pathological waves were elicited.  Occasional movement and muscle

artifacts are seen.  Hyperventilation was not performed.  The patient does reach stage

II of sleep during the tracing and occasional sleep spindles are seen.  No epileptiform

discharges were seen.  His EKG lead showed a regular rate and rhythm.



INTERPRETATION:

This asleep and awake EEG can be considered within normal limits.  There was no

asymmetry seen.  No epileptiform discharges were noticed.  The absence of epileptiform

discharges does not rule out the diagnosis of epilepsy; therefore clinical correlation

is recommended.





MMJOSE F / RITU: [id number] / Job#: [id number]

## 2018-06-13 NOTE — MR
EXAMINATION TYPE: MR brain wo con

 

DATE OF EXAM: 6/13/2018

 

COMPARISON: CT brain 6/11/2018

 

HISTORY: Rt sided weakness, slurred speech

 

CONTRAST:  Performed utilizing 0 mL intravenous Gadavist gadolinium contrast.  

 

TECHNIQUE: Multiplanar, multiecho imaging on a 3.0 Nai magnet is performed through the brain.  Stud
y is not performed within 24 hours of arrival to the hospital.

 

The craniovertebral junction is normal.  The pituitary is normal.  

 

Diffusion-weighted imaging is performed.  There is increased signal within the mid left basal ganglio
n. Areas of increased signal are also present extending towards the posterior lateral capsule. These 
areas are can be compatible with acute ischemic changes. These areas are hyperintense on the T2-weigh
robert sequences. There are additional 2 and inversion recovery weighted sequence hyperintensities in th
e deep white matter, compatible with chronic white matter ischemic changes.

 

Ventricles and sulci are appropriate for the patient age.

 

 

IMPRESSIONS:

1. Acute ischemic changes within the left basal ganglion.

2. Patchy to confluent periventricular white matter changes are likely chronic.

## 2018-06-13 NOTE — P.PN
Subjective


Progress Note Date: 06/13/18


Principal diagnosis: 





Stroke





Is a pleasant 68-year-old  male continuing be evaluated by the 

neurology service for stroke.  Call that his initial complaints were that of 

slurred speech and right-sided weakness.  He the brain showed no acute 

intracranial abnormalities.  CTA of the head and neck were normal.  He is 

currently on Lipitor and Plavix.  It's time of my exam he is undergoing speech 

therapy evaluation.  He is resting comfortably in no acute distress.  He has no 

new neurological symptoms since her last exam.  His MRI of the brain has just 

been accomplished and did show acute ischemic changes within the left basal 

ganglion.  He also has diffuse periventricular white matter changes likely 

related to chronic white matter ischemia.





Objective





- Vital Signs


Vital signs: 


 Vital Signs











Temp  98.5 F   06/13/18 11:14


 


Pulse  68   06/13/18 11:14


 


Resp  18   06/13/18 11:14


 


BP  192/88   06/13/18 11:14


 


Pulse Ox  99   06/13/18 11:14








 Intake & Output











 06/12/18 06/13/18 06/13/18





 18:59 06:59 18:59


 


Intake Total 624  425


 


Balance 624  425


 


Weight  94.8 kg 


 


Intake:   


 


  Oral 624  425


 


Other:   


 


  Voiding Method Toilet Toilet 


 


  # Voids 2 1 














- Constitutional


General appearance: Present: cooperative, no acute distress, obese





- EENT


Eyes: Present: EOMI, PERRLA.  Absent: abnormal pupil, ptosis


ENT: Present: hearing grossly normal





- Neck


Neck: Present: normal ROM.  Absent: rigidity





- Respiratory


Respiratory: negative: prolonged expiration, prolonged inspiration





- Cardiovascular


Rhythm: regular





- Gastrointestinal


General gastrointestinal: Absent: distended, tenderness





- Neurologic


Neurologic Comment(s): 





Patient is awake, alert and oriented 3.  Speech is dysarthric.  Language 

testing is undergoing right now.  Strength is 4 out of 5 in right upper 

extremity 4+ out of 5 in right lower extremity and 5 minus out of 5 on the left 

side.  There is no sensory deficit.  Tremors or seizure-like activities are 

seen.  Right facial weakness is obvious.





- Labs


CBC & Chem 7: 


 06/11/18 16:55





 06/11/18 16:55





Assessment and Plan


(1) Dysarthria


Current Visit: Yes   Status: Acute   Code(s): R47.1 - DYSARTHRIA AND ANARTHRIA 

  SNOMED Code(s): 1540223


   





(2) Right hemiparesis


Current Visit: Yes   Status: Acute   Code(s): G81.91 - HEMIPLEGIA, UNSPECIFIED 

AFFECTING RIGHT DOMINANT SIDE   SNOMED Code(s): 825291340


   





(3) Hypertension


Current Visit: Yes   Status: Chronic   Code(s): I10 - ESSENTIAL (PRIMARY) 

HYPERTENSION   SNOMED Code(s): 45027868


   





(4) Dyslipidemia


Current Visit: Yes   Status: Chronic   Code(s): E78.5 - HYPERLIPIDEMIA, 

UNSPECIFIED   SNOMED Code(s): 263873772


   





(5) Cerebrovascular accident


Current Visit: Yes   Status: Acute   Code(s): I63.9 - CEREBRAL INFARCTION, 

UNSPECIFIED   SNOMED Code(s): 729459713


   


Plan: 





The patient has indeed suffered an acute ischemic stroke of the right middle 

cerebral artery distribution.  His right-sided deficits and dysarthria 

continue.  They have not worsened.  He'll continue Plavix 75 mg daily and 

Lipitor 20 mg daily.  Continue physical and occupational therapy.  Further 

neurological workup is needed.  We will see him in an outpatient setting and if 

his serum homocysteine is elevated we would start him on folic acid 

supplementation.





I have performed a history and physical on the above patient.  I have reviewed 

the above note, and agree.

## 2018-06-13 NOTE — P.PN
Subjective


Patient is found to have left basal ganglion stroke and patient will need 

inpatient rehab rotation patient is presently on Plavix which was switched from 

aspirin 81 mg ration is awaiting disposition to inpatient rehabilitation and 

echocardiogram did not show any intraventricular thrombus.





Constitutional: Denied any fatigue denied any fever.


Cardio vascular: denied any chest pain, palpitations


Gastrointestinal denied any nausea vomiting


Pulmonary: Denied any shortness of breath cough


Neurologic denied any new focal deficits compared to yesterday








Objective





- Vital Signs


Vital signs: 


 Vital Signs











Temp  98.5 F   06/13/18 16:00


 


Pulse  62   06/13/18 16:00


 


Resp  18   06/13/18 16:00


 


BP  194/87   06/13/18 16:00


 


Pulse Ox  99   06/13/18 16:00








 Intake & Output











 06/12/18 06/13/18 06/13/18





 18:59 06:59 18:59


 


Intake Total 624  425


 


Output Total   300


 


Balance 624  125


 


Weight  94.8 kg 


 


Intake:   


 


  Oral 624  425


 


Output:   


 


  Urine   300


 


Other:   


 


  Voiding Method Toilet Toilet 


 


  # Voids 2 1 1














- Exam


PHYSICAL EXAMINATION: 





GENERAL: The patient is alert and oriented x3, not in any acute distress. Well 

developed, well nourished. 


HEENT: Pupils are round and equally reacting to light. EOMI. No scleral 

icterus. No conjunctival pallor. Normocephalic, atraumatic. No pharyngeal 

erythema. No thyromegaly. 


CARDIOVASCULAR: S1 and S2 present. No murmurs, rubs, or gallops. 


PULMONARY: Chest is clear to auscultation, no wheezing or crackles. 


ABDOMEN: Soft, nontender, nondistended, normoactive bowel sounds. No palpable 

organomegaly. 


MUSCULOSKELETAL: No joint swelling or deformity.


EXTREMITIES: No cyanosis, clubbing, or pedal edema. 


NEUROLOGICAL:  some droop of his right hand although does have normal strength 

in the right hand and leg with some facial droop to the left, no change 

compared to yesterday


SKIN: No rashes. 











- Labs


CBC & Chem 7: 


 06/11/18 16:55





 06/11/18 16:55





Assessment and Plan


Plan: 


-Possibility of CVA involving the left basal ganglia, ischemic in nature.  MRA 

showed basal ganglia stroke patient is on statin and Plavix awaiting 

disposition to inpatient rehab rotation 


-Hypertension: Continue with his losartan and hydrochlorothiazide


-Prostate cancer will resume his biologic agents and antiandrogen therapy


-CVA TIA in the past

## 2018-06-14 VITALS
TEMPERATURE: 97.4 F | DIASTOLIC BLOOD PRESSURE: 84 MMHG | RESPIRATION RATE: 16 BRPM | HEART RATE: 71 BPM | SYSTOLIC BLOOD PRESSURE: 170 MMHG

## 2018-06-14 RX ADMIN — ASPIRIN SCH MG: 325 TABLET ORAL at 09:16

## 2018-06-14 RX ADMIN — LOSARTAN POTASSIUM AND HYDROCHLOROTHIAZIDE SCH EACH: 12.5; 5 TABLET ORAL at 09:19

## 2018-06-14 RX ADMIN — ETODOLAC SCH MG: 400 TABLET, FILM COATED ORAL at 09:19

## 2018-06-14 RX ADMIN — CLOPIDOGREL BISULFATE SCH MG: 75 TABLET ORAL at 09:18

## 2018-06-14 RX ADMIN — ETODOLAC SCH: 400 TABLET, FILM COATED ORAL at 09:26

## 2018-06-14 NOTE — P.DS
Providers


Date of admission: 


06/11/18 19:04





Attending physician: 


Margie Bloom





Consults: 





 





06/11/18 19:05


Consult Physician Routine 


   Consulting Provider: Morena Chaudhari


   Consult Reason/Comments: CVA


   Do you want consulting provider notified?: Yes





06/12/18 15:03


Consult Physician Routine 


   Consulting Provider: Jed Cifuentes


   Consult Reason/Comments: inpatient rehab


   Do you want consulting provider notified?: Yes











Primary care physician: 


[facility] Course: 


Patient is found to have left basal ganglion stroke and patient will need 

inpatient rehab rotation patient is presently on Plavix which was switched from 

aspirin 81 mg ration is awaiting disposition to inpatient rehabilitation and 

echocardiogram did not show any intraventricular thrombus.





06/14/2018 


patient is otherwise clinically doing well and Plavix will be continued 

[place]spinall were discontinued and patient will be discharged to inpatient 

intimidation.





PHYSICAL EXAMINATION: 





GENERAL: The patient is alert and oriented x3, not in any acute distress. Well 

developed, well nourished. 


HEENT: Pupils are round and equally reacting to light. EOMI. No scleral 

icterus. No conjunctival pallor. Normocephalic, atraumatic. No pharyngeal 

erythema. No thyromegaly. 


CARDIOVASCULAR: S1 and S2 present. No murmurs, rubs, or gallops. 


PULMONARY: Chest is clear to auscultation, no wheezing or crackles. 


ABDOMEN: Soft, nontender, nondistended, normoactive bowel sounds. No palpable 

organomegaly. 


MUSCULOSKELETAL: No joint swelling or deformity.


EXTREMITIES: No cyanosis, clubbing, or pedal edema. 


NEUROLOGICAL:  some droop of his right hand although does have normal strength 

in the right hand and leg with some facial droop to the left, no change 

compared to yesterday


SKIN: No rashes. 





Assessment and Plan


Plan: 


-CVA involving the left basal ganglia, ischemic in nature.  MRA showed basal 

ganglia stroke patient is on statin and Plavix.


-Hypertension: Continue with his losartan and hydrochlorothiazide, adding 

amlodipine 5 mg as his blood pressure is not well controlled


-Prostate cancer will resume his biologic agents and antiandrogen therapy


-CVA /TIA in the past





Patient Condition at Discharge: Stable





Plan - Discharge Summary


New Discharge Prescriptions: 


New


   amLODIPine [Norvasc] 5 mg PO DAILY #30 tab


   Atorvastatin [Lipitor] 40 mg PO HS  tab


   Clopidogrel [Plavix] 75 mg PO DAILY  tab





Continue


   Omega-3 Fatty Acids [Omega-3] 1,000 mg PO DAILY


   Glucosam/Guy-Msm1/C/Tony/Bosw [Glucosamine-Chondroitin Tablet] 1 tab PO 

DAILY


   Cholecalciferol [Vitamin D3] 1,000 unit PO DAILY


   Acetaminophen-Codeine 300-30mg [Tylenol w/codeine #3] 1 tab PO Q6H PRN


     PRN Reason: Pain


   Leuprolide Acetate [Eligard] 45 mg SQ Q180D


   Diclofenac Sodium [Voltaren] 75 mg PO BID


   Losartan/Hydrochlorothiazide [Losartan-Hctz 100-25 mg Tab] 1 tab PO DAILY


   Enzalutamide [Xtandi] 160 mg PO DAILY





Discontinued


   Aspirin EC [Ecotrin Low Dose] 81 mg PO DAILY


Discharge Medication List





Acetaminophen-Codeine 300-30mg [Tylenol w/codeine #3] 1 tab PO Q6H PRN 06/11/18 

[History]


Cholecalciferol [Vitamin D3] 1,000 unit PO DAILY 06/11/18 [History]


Diclofenac Sodium [Voltaren] 75 mg PO BID 06/11/18 [History]


Enzalutamide [Xtandi] 160 mg PO DAILY 06/11/18 [History]


Glucosam/Guy-Msm1/C/Tony/Bosw [Glucosamine-Chondroitin Tablet] 1 tab PO DAILY 

06/11/18 [History]


Leuprolide Acetate [Eligard] 45 mg SQ Q180D 06/11/18 [History]


Losartan/Hydrochlorothiazide [Losartan-Hctz 100-25 mg Tab] 1 tab PO DAILY 06/11/ 18 [History]


Omega-3 Fatty Acids [Omega-3] 1,000 mg PO DAILY 06/11/18 [History]


Atorvastatin [Lipitor] 40 mg PO HS  tab 06/14/18 [Rx]


Clopidogrel [Plavix] 75 mg PO DAILY  tab 06/14/18 [Rx]


amLODIPine [Norvasc] 5 mg PO DAILY #30 tab 06/14/18 [Rx]








Follow up Appointment(s)/Referral(s): 


Benita Gaytan MD [Primary Care Provider] - 1-2 days


Morena Chaudhari MD [STAFF PHYSICIAN] - 2 Weeks


Patient Instructions/Handouts:  Stroke (DC)


Activity/Diet/Wound Care/Special Instructions: 


Possible inpatient rehab on D/C.


Discharge Disposition: DISCH TO HOSPICE MED FACILTY

## 2019-08-26 ENCOUNTER — HOSPITAL ENCOUNTER (OUTPATIENT)
Dept: HOSPITAL 47 - LABWHC1 | Age: 69
Discharge: HOME | End: 2019-08-26
Attending: UROLOGY
Payer: MEDICARE

## 2019-08-26 DIAGNOSIS — M81.8: ICD-10-CM

## 2019-08-26 DIAGNOSIS — C61: Primary | ICD-10-CM

## 2019-08-26 DIAGNOSIS — C79.51: ICD-10-CM

## 2019-08-26 LAB
ALBUMIN SERPL-MCNC: 4.5 G/DL (ref 3.8–4.9)
ALBUMIN/GLOB SERPL: 2.05 G/DL (ref 1.6–3.17)
ALP SERPL-CCNC: 98 U/L (ref 41–126)
ALT SERPL-CCNC: 10 U/L (ref 10–49)
ANION GAP SERPL CALC-SCNC: 7.6 MMOL/L (ref 4–12)
AST SERPL-CCNC: 16 U/L (ref 14–35)
BILIRUB INDIRECT SERPL-MCNC: 0.5 MG/DL
BUN SERPL-SCNC: 17 MG/DL (ref 9–27)
BUN/CREAT SERPL: 21.25 RATIO (ref 12–20)
CALCIUM SPEC-MCNC: 9.4 MG/DL (ref 8.7–10.3)
CHLORIDE SERPL-SCNC: 101 MMOL/L (ref 96–109)
CO2 SERPL-SCNC: 31.4 MMOL/L (ref 21.6–31.8)
ERYTHROCYTE [DISTWIDTH] IN BLOOD BY AUTOMATED COUNT: 4.12 M/UL (ref 4.3–5.9)
ERYTHROCYTE [DISTWIDTH] IN BLOOD: 13.7 % (ref 11.5–15.5)
GLOBULIN SER CALC-MCNC: 2.2 G/DL (ref 1.6–3.3)
GLUCOSE SERPL-MCNC: 117 MG/DL (ref 70–110)
HCT VFR BLD AUTO: 37.2 % (ref 39–53)
HGB BLD-MCNC: 12.7 GM/DL (ref 13–17.5)
MAGNESIUM SPEC-SCNC: 2 MG/DL (ref 1.5–2.4)
MCH RBC QN AUTO: 30.8 PG (ref 25–35)
MCHC RBC AUTO-ENTMCNC: 34.1 G/DL (ref 31–37)
MCV RBC AUTO: 90.2 FL (ref 80–100)
PLATELET # BLD AUTO: 196 K/UL (ref 150–450)
POTASSIUM SERPL-SCNC: 4 MMOL/L (ref 3.5–5.5)
PROT SERPL-MCNC: 6.7 G/DL (ref 6.2–8.2)
SODIUM SERPL-SCNC: 140 MMOL/L (ref 135–145)
WBC # BLD AUTO: 6.5 K/UL (ref 3.8–10.6)

## 2019-08-26 PROCEDURE — 85027 COMPLETE CBC AUTOMATED: CPT

## 2019-08-26 PROCEDURE — 36415 COLL VENOUS BLD VENIPUNCTURE: CPT

## 2019-08-26 PROCEDURE — 84403 ASSAY OF TOTAL TESTOSTERONE: CPT

## 2019-08-26 PROCEDURE — 82306 VITAMIN D 25 HYDROXY: CPT

## 2019-08-26 PROCEDURE — 80076 HEPATIC FUNCTION PANEL: CPT

## 2019-08-26 PROCEDURE — 84153 ASSAY OF PSA TOTAL: CPT

## 2019-08-26 PROCEDURE — 83735 ASSAY OF MAGNESIUM: CPT

## 2019-08-26 PROCEDURE — 80048 BASIC METABOLIC PNL TOTAL CA: CPT

## 2019-08-26 PROCEDURE — 84100 ASSAY OF PHOSPHORUS: CPT

## 2022-05-26 ENCOUNTER — HOSPITAL ENCOUNTER (OUTPATIENT)
Dept: HOSPITAL 47 - ORWHC2ENDO | Age: 72
Discharge: HOME | End: 2022-05-26
Attending: INTERNAL MEDICINE
Payer: MEDICARE

## 2022-05-26 VITALS — HEART RATE: 62 BPM | SYSTOLIC BLOOD PRESSURE: 107 MMHG | RESPIRATION RATE: 16 BRPM | DIASTOLIC BLOOD PRESSURE: 68 MMHG

## 2022-05-26 VITALS — TEMPERATURE: 97.4 F

## 2022-05-26 VITALS — BODY MASS INDEX: 32.3 KG/M2

## 2022-05-26 DIAGNOSIS — Z79.899: ICD-10-CM

## 2022-05-26 DIAGNOSIS — R01.1: ICD-10-CM

## 2022-05-26 DIAGNOSIS — Z79.891: ICD-10-CM

## 2022-05-26 DIAGNOSIS — I69.351: ICD-10-CM

## 2022-05-26 DIAGNOSIS — D12.5: ICD-10-CM

## 2022-05-26 DIAGNOSIS — I10: ICD-10-CM

## 2022-05-26 DIAGNOSIS — Z97.2: ICD-10-CM

## 2022-05-26 DIAGNOSIS — Z85.46: ICD-10-CM

## 2022-05-26 DIAGNOSIS — Z79.890: ICD-10-CM

## 2022-05-26 DIAGNOSIS — Z79.02: ICD-10-CM

## 2022-05-26 DIAGNOSIS — K64.1: ICD-10-CM

## 2022-05-26 DIAGNOSIS — D12.4: Primary | ICD-10-CM

## 2022-05-26 DIAGNOSIS — E78.5: ICD-10-CM

## 2022-05-26 LAB — GLUCOSE BLD-MCNC: 107 MG/DL (ref 75–99)

## 2022-05-26 PROCEDURE — 45385 COLONOSCOPY W/LESION REMOVAL: CPT

## 2022-05-26 PROCEDURE — 88305 TISSUE EXAM BY PATHOLOGIST: CPT

## 2022-05-26 NOTE — P.PCN
Date of Procedure: 05/26/22


Procedure(s) Performed: 


BRIEF HISTORY: Patient is a 72-year-old pleasant white male scheduled for an 

elective colonoscopy as a part of positive cologuard.  No prior history of 

colonoscopy.





PROCEDURE PERFORMED: Colonoscopy with snare polypectomy. 





PREOPERATIVE DIAGNOSIS: Positive cologuard. 





IV sedation per Anesthesia. 





PROCEDURE: After informed consent was obtained, the patient, was brought into 

the endoscopy unit. IV sedation was administered by Anesthesia under continuous 

monitoring.  Digital rectal examination was normal. Initially the Olympus CF-160

flexible video colonoscope was then inserted in the rectum, gradually advanced 

into the cecum without any difficulty. Careful examination was performed as the 

scope was gradually being withdrawn. Ileocecal valve and the appendiceal orifice

were visualized and appeared normal.  Prep was excellent. Mucosa of the cecum, 

ascending colon, transverse colon, appeared normal.  In the descending colon 

there was a 5 mm polyp removed by snare polypectomy.  In the sigmoid colon there

was a 1 cm polyp removed by snare polypectomy.  Rest of the descending colon, 

sigmoid colon, and rectum appeared normal. Retroflexion was performed in the 

rectum and grade 2 internal hemorrhoids were seen. The patient tolerated the 

procedure well. 





IMPRESSION: 


6 mm descending colon polyp status post polypectomy


1 cm sigmoid colon polyp status post polypectomy


Grade 2 internal hemorrhoids





RECOMMENDATIONS:  Findings of this examination were discussed with the patient 

as well as his family.  She was advised to follow with the biopsy results.  If 

the biopsy results adenoma he can have a repeat colonoscopy in 3 years..